# Patient Record
Sex: MALE | Race: WHITE | NOT HISPANIC OR LATINO | Employment: FULL TIME | URBAN - METROPOLITAN AREA
[De-identification: names, ages, dates, MRNs, and addresses within clinical notes are randomized per-mention and may not be internally consistent; named-entity substitution may affect disease eponyms.]

---

## 2018-02-23 PROBLEM — I10 ESSENTIAL HYPERTENSION: Status: ACTIVE | Noted: 2018-02-23

## 2018-02-23 PROBLEM — M79.7 FIBROMYALGIA: Status: ACTIVE | Noted: 2018-02-23

## 2018-02-23 PROBLEM — K51.90 ULCERATIVE COLITIS WITHOUT COMPLICATIONS: Status: ACTIVE | Noted: 2018-02-23

## 2018-05-09 PROBLEM — K51.90 ACUTE ULCERATIVE COLITIS: Status: ACTIVE | Noted: 2018-05-09

## 2018-10-24 PROBLEM — E78.5 HYPERLIPIDEMIA: Status: ACTIVE | Noted: 2018-10-24

## 2019-04-09 ENCOUNTER — PATIENT OUTREACH (OUTPATIENT)
Dept: ADMINISTRATIVE | Facility: HOSPITAL | Age: 64
End: 2019-04-09

## 2019-08-21 PROBLEM — K51.90 UC (ULCERATIVE COLITIS): Status: ACTIVE | Noted: 2019-08-21

## 2019-09-10 PROBLEM — D75.839 THROMBOCYTOSIS: Status: ACTIVE | Noted: 2019-09-10

## 2019-10-15 PROBLEM — J98.4 PNEUMATOCELE OF LUNG: Status: ACTIVE | Noted: 2019-10-15

## 2019-10-15 PROBLEM — R76.8 HEPATITIS C ANTIBODY TEST POSITIVE: Status: ACTIVE | Noted: 2019-10-15

## 2019-10-23 PROBLEM — K51.90 ACUTE ULCERATIVE COLITIS: Status: RESOLVED | Noted: 2018-05-09 | Resolved: 2019-10-23

## 2019-10-23 PROBLEM — K51.90 UC (ULCERATIVE COLITIS): Status: RESOLVED | Noted: 2019-08-21 | Resolved: 2019-10-23

## 2020-01-17 ENCOUNTER — TELEPHONE (OUTPATIENT)
Dept: PHARMACY | Facility: CLINIC | Age: 65
End: 2020-01-17

## 2020-01-17 NOTE — TELEPHONE ENCOUNTER
Informed Patient  that [facility] received prescription for Hydroxyurea and benefits investigation is required.  [facility] will be back in touch once insurance determination is received.

## 2020-01-17 NOTE — TELEPHONE ENCOUNTER
DOCUMENTATION ONLY:  Hydroxyurea 500 mg Capsule #60/30 does not require a prior authorization through the patient's insurance.     Co-pay: $1.00    Patient assistance IS NOT required. Sending to clinical pharmacist for  and shipment. Gladys SANCHEZ

## 2020-01-20 NOTE — TELEPHONE ENCOUNTER
Initial hydroxyurea consult completed on  . Hydroxyurea will be shipped on  to arrive at patient's home on  via [employer]. $ 1.00 copay. Patient plans to start hydroxyurea on . Address confirmed, CC on file. Confirmed 2 patient identifiers - name and . Therapy Appropriate.     Patient was counseled on the administration directions:  -Take 2 capsules (1000 mg) by mouth daily with or without food   -Do not chew, crush, or open capsules.   If possible, patient was instructed tip the tablets from the RX bottle to the cap, and take directly from the cap to the mouth.  Patient may handle the medication with their hands if they wear with a latex or nitrile glove and wash their hands before and after handling the tablets.  -Store medication at room temperature, away from direct sunlight, moisture and out of reach of children or pets    Patient states that he reviewed over the medication with the MD and read up on material. He declined going over the medication side effects at consultation today. Advised him to reach out to [facility] if he has any questions or concerns.     -Advise on appropriate sun protection including sunscreen, hats and long pants and sleeves when necessary to avoid increased risk of secondary skin cancers.  Long term use of hydroxyurea has been associated with myeloproliferative disorders and secondary leukemia as well.    DDIs:  Medication list reviewed.    Patient was given 2 patient education handouts on how to handle oral chemotherapy and specific recommendations- do's and don'ts. Instructed the patient that if they have any remaining oral chemotherapy, not to flush down the toilet or throw away in the trash; The patient or caregiver should return the unused oral chemotherapy to either the clinic or to myself in the Pharmacy where the oral chemotherapy can be disposed of properly.     Patient confirmed understanding. Patient did not have additional questions.   Consultation included:  indication; goals of treatment; administration; storage and handling; side effects; how to handle side effects; the importance of compliance; how to handle missed doses; the importance of laboratory monitoring; the importance of keeping all follow up appointments.  Patient understands to report any medication changes to [facility] and provider. All questions answered and addressed to patients satisfaction. I will f/u with patient in 1 week from start, [facility] to contact patient in 3 weeks for refills.

## 2020-01-28 NOTE — TELEPHONE ENCOUNTER
Contacted the patient in regards to Hydrea 7 day touchbase. He confirmed starting on 1/21 as planned and had lab work today. He has not had side effects to the medication but continues to have nausea, diarrhea/constipation and fatigue that was present at baseline. He confirmed proper administration: 2 capsules once every morning. He has been appropriately storing at room temp. No missed doses. Discussed we will continue to have an RPh f/u every three months but he can reach out with any questions/concerns.

## 2020-02-05 ENCOUNTER — TELEPHONE (OUTPATIENT)
Dept: PHARMACY | Facility: CLINIC | Age: 65
End: 2020-02-05

## 2020-02-05 NOTE — TELEPHONE ENCOUNTER
Contacted patient to let him know his provider has sent over a prescription to his local [facility] pharmacy for Zofran to help with his nausea. Patient verbalized understanding and has no further questions or concerns at this time. He is aware to contact [facility] if he needs anything else.

## 2020-02-05 NOTE — TELEPHONE ENCOUNTER
Contacted patient to discuss Hydrea dose increase and set up refill. Patient was increased to Hydrea 500 mg - 4 capsules QD. He has been taking this dose for a week now. He has about 3 days of therapy on hand. Denies missed doses. Will ship medication on 2/6 for the patient to receive on 2/7. $2 copay. CCOF No changes in medications allergies or health conditions.     Patient complains of foot and leg cramps/pain that was present prior to Hydrea therapy. He states he was told it was a circulation issue due to his blood being thick and that it should improve as his platelet counts go down. Patient reports previously taking opioid pain medication, but did not like to as he rather treat the underlying cause and manage the pain. Recommended to take OTC Advil or Aleve and/or Tylenol as needed to help ease pain in the meantime as they try to get his counts down.     Also, complains of fatigue, dizziness, and nausea since starting Hydrea. Monitor fatigue/dizziness. Asked whether he would like us to get his provider to send a prescription for an antiemetic to his local pharmacy. Patient would like that and asked if provider could send to the [facility] listed. Will message provider.     Patient has no questions or concerns at this time. He is aware to contact [facility] if he needs anything.

## 2020-02-21 ENCOUNTER — TELEPHONE (OUTPATIENT)
Dept: PHARMACY | Facility: CLINIC | Age: 65
End: 2020-02-21

## 2020-02-21 NOTE — TELEPHONE ENCOUNTER
Contacted patient to let him know provider would like him to reduced his Hydrea dose to 3 tablets daily based on Plt count of 288k on 2/19/2020. Patient verbalized understanding and states he is currently taking the medication this way. He is scheduled to get labs again on Wednesday, 2/26. Will follow up to see if he will continue on this dose, so we could reach out to the provider for a new prescription. The patient reports having plenty of medication on hand. He has no questions or concerns at this time. He is aware to contact [facility] if he needs anything.

## 2020-02-26 NOTE — TELEPHONE ENCOUNTER
Contacted patient to see if any further changes have been made to his dose following labs. Patient states the MD office has no contacted him yet to discuss. The patient currently takes Hydrea 500 mg - 3 capsules QD. Patient does not have any questions or concerns at this time. Will follow up to see if any further changes will be made to dose.

## 2020-03-05 NOTE — TELEPHONE ENCOUNTER
Contacted patient for Hydroxyurea refill and confirm he is still taking 3 capsules QD as instructed. Patient confirmed dose and directions. He is unsure of the quantity on hand, but estimates about 10 days remaining. Denies missed doses. Will ship medication on 3/9 for the patient to receive on 3/10. $2 copay. CCOF. Address confirmed. No changes in allergies or health conditions. Patient reports having issues with nausea and was recently prescribed ondansetron. He states he take the medication at bedtime with food to help as he worries of the side effects of ondansetron. Discussed his concerns and informed him if he would like to try ondansetron to take 30 minutes prior to hydroxyurea to give it a chance to work. He has no further questions or concerns at this time. He is aware to contact [facility] if he needs anything.

## 2020-04-03 ENCOUNTER — TELEPHONE (OUTPATIENT)
Dept: PHARMACY | Facility: CLINIC | Age: 65
End: 2020-04-03

## 2020-04-21 ENCOUNTER — TELEPHONE (OUTPATIENT)
Dept: PHARMACY | Facility: CLINIC | Age: 65
End: 2020-04-21

## 2020-04-21 NOTE — TELEPHONE ENCOUNTER
"Hydroxyurea follow-up:  Spoke with Mr. Stevenson, regarding ongoing hydroxyurea therapy.  Overall, he tolerates hydroxyurea well and "my counts are good", however, he continues to have severe pain that can debilitate him at times.     Dosing, how taking: Continues to alternate between 2 and 3 tablets on designated days of the week.  No missed doses.    Side effects: Patient denies any infections, rash/skin reactions, etc.  Reports occasional nausea, but uses ondansetron as needed.  He is mindful to protect himself from the sun and wears a hat, long sleeves and uses sunscreen when outdoors.  He works at a [employer] outside but is in the shade most of the time.    Recent infections: No recent infections/fevers.    Pain: 10/10 - he has severe pain that has been "having for years but its just getting worse".  He reports having "arthritis" that used to effect just his legs, but now seems to effect all areas of his body.  He describes the pain as "cramps" in his chest, arms, legs, etc.  He has never seen a rheumatologist to confirm diagnosis of arthritis and attempt to treat as an inflammatory condition.      He reports that he has tried OTC APAP and NSAIDs in the past and Lyrica, none of which helped.  The only thing that did help was Norco.  He took for about 3 years, but didn't want to be on opioids any longer and discontinued therapy.  He wishes to determine the source of his pain and treat this to eliminate the pain.  Will have appointment with PCP this week and will inquire about possibly seeing a rheumatologist to confirm/rule out an inflammatory condition.  The pain makes working difficult at times and can be debilitating causing his body to "lock up".    Appetite: Good - no changes.  Continues to eat well and drink plenty of water to stay well hydrated.    Energy, fatigue: Still working, but it is becoming increasingly difficult with severe pain.    Health, mood, QOL: Concerns about pain.  Will address with provider at " virtual visit scheduled later this week.    ED/UC visits: None.    Next clinical follow up: 3 months.  As patient wanted to discuss mostly pain, will try to complete more in-depth follow up in 3 months including storage, handling, and complete medication reconciliation.    Full medication reconciliation declined by patient, but reports being compliant with BP medications and reports no other changes.    Labs from 4/15/2020 reviewed - WBC, ANC, platelets all normal.  RBC stable and patient's MCV is elevated, but this is to be expected with hydroxyurea therapy.  Kidney and liver function normal.  Therapy remains appropriate.

## 2020-05-01 ENCOUNTER — TELEPHONE (OUTPATIENT)
Dept: PHARMACY | Facility: CLINIC | Age: 65
End: 2020-05-01

## 2020-05-18 ENCOUNTER — LAB VISIT (OUTPATIENT)
Dept: PRIMARY CARE CLINIC | Facility: CLINIC | Age: 65
End: 2020-05-18
Payer: MEDICAID

## 2020-05-18 DIAGNOSIS — R05.9 COUGH: Primary | ICD-10-CM

## 2020-05-18 PROCEDURE — U0003 INFECTIOUS AGENT DETECTION BY NUCLEIC ACID (DNA OR RNA); SEVERE ACUTE RESPIRATORY SYNDROME CORONAVIRUS 2 (SARS-COV-2) (CORONAVIRUS DISEASE [COVID-19]), AMPLIFIED PROBE TECHNIQUE, MAKING USE OF HIGH THROUGHPUT TECHNOLOGIES AS DESCRIBED BY CMS-2020-01-R: HCPCS

## 2020-05-19 LAB — SARS-COV-2 RNA RESP QL NAA+PROBE: NOT DETECTED

## 2020-05-27 ENCOUNTER — TELEPHONE (OUTPATIENT)
Dept: PHARMACY | Facility: CLINIC | Age: 65
End: 2020-05-27

## 2020-05-27 NOTE — TELEPHONE ENCOUNTER
"Patient called to refill Hydroxyurea 500 mg Capsule #90/30 the patient stated he "thinks" he has 8-9 days on hand. He is taking 3 Capsules daily, he took the medication today.  Ship 06/01 for 06/02 delivery.  Verified address.  Copay $0.00 in 004.  Patient has not started any new medications including OTC drugs. Patient has not had any medication/ dose or instruction changes. No new allergies or side effects reported with this shipment. Medication is being taken as prescribed by physician and properly stored.  Declined [facility] . -CAZ  "

## 2020-06-30 ENCOUNTER — TELEPHONE (OUTPATIENT)
Dept: PHARMACY | Facility: CLINIC | Age: 65
End: 2020-06-30

## 2020-06-30 NOTE — TELEPHONE ENCOUNTER
Refill call regarding Hydroxyurea at [facility].  Will prepare for shipment with patient consent on  to arrive .  Copay 0.00.  Patient states he has about 10 days on hand.  Patient has not started any new medications including OTC drugs. Patient has not had any medication/ dose or instruction changes. No new allergies or side effects reported with this shipment. Medication is being taken as prescribed by physician and properly stored. Two patient identifiers:  and Address verified. Patient has no questions or concerns for [facility].

## 2020-07-08 PROBLEM — D47.3 ESSENTIAL THROMBOCYTHEMIA: Status: ACTIVE | Noted: 2020-07-08

## 2020-07-13 ENCOUNTER — TELEPHONE (OUTPATIENT)
Dept: PHARMACY | Facility: CLINIC | Age: 65
End: 2020-07-13

## 2020-07-13 RX ORDER — ASPIRIN 81 MG/1
81 TABLET ORAL DAILY
COMMUNITY
End: 2022-10-05

## 2020-07-13 NOTE — TELEPHONE ENCOUNTER
Patient reached for followup assessment of hydroxyurea therapy. Patient reports he is currently taking 2 capsules (1000mg) qhs. Dose appropriate for dx of Essential thrombocythemia [D47.3], titrated based on response. inBasket sent to provider to request updated script with correct sig. (current sig is 3 caps (1500mg) daily.Patient reports no missed doses.   Labs: 7/8/2020: LFTs, CMP, and platelets WNL; CBC stable.    Dosing: Currently taking 2 capsules every day. Reduced dose following 7/8 MDO appt and plans to check back in with provider in one month to discuss respone. Has upcoming bloodwork (standing orders). Takes dose with evening meal before bed. Stores in weekly pill box. Never touches capsules (pours from vial into pill box and from pill box directly into mouth. Is aware of handling precautions.     Side effects: Patient listed symptoms of disease and medication side effects together:   Fatigue: Cut hours back at work from 70-90; back to 40-45 hours a week. Continues to work at the [employer] early to late. Still goes in early and leaves early.   sun sensitivity. Wearing sun hat with neck protector, and long sleeves. Drinks lots of fluids.  nausea - Gets nauseous in the morning - eating right away helps; only takes Zofran as a last resort.   Bad leg cramps at night. Feet feel like they're on fire.     Pain/QoL: Pain gets up to a 10 - wakes up multiple times a night to walk off the cramps. Was getting them in his hands but Voltaren gel once a day helps. Did try Voltaren gel more often but attributed GI bleed to its more frequent use (comorbities include Ulcerative colitis without complications [K51.90])  Health QoL (10-best) unanswered. Patient states he is pretty limited. Trouble with stairs. Can't run. Trouble walking. 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   ED/UC visits: none    Medication list reviewed. No new allergies or health conditions. Added Aspirin 81mg to drug list. No significant interactions encountered.  Patient states that provider does not believe atorvastatin 80mg is related to leg cramps. However, cholesterol is well controlled. inBasket sent to PCP to recommend that atorvastatin 40mg be trialed if she thinks it appropriate.     Next clinical follow up: Every month for MD; same for bloodwork. Next [facility] followup 90 days.

## 2020-08-08 ENCOUNTER — TELEPHONE (OUTPATIENT)
Dept: PHARMACY | Facility: CLINIC | Age: 65
End: 2020-08-08

## 2020-08-08 NOTE — TELEPHONE ENCOUNTER
Patient returned call regarding Hydroxyurea refill. Has about 3 weeks on hand. Patient is seeing MDO on 8/11 and said that medication might change. Patient does not have any refills left, so waiting until after appt to send refill request. Since patient has so many on hand, pending activity out to next week

## 2020-08-08 NOTE — TELEPHONE ENCOUNTER
RX call attempt 1 for Hydroxyurea refill at [facility]. $0 copay. No Answer, LVM. Mychart status declined.

## 2020-08-18 ENCOUNTER — TELEPHONE (OUTPATIENT)
Dept: PHARMACY | Facility: CLINIC | Age: 65
End: 2020-08-18

## 2020-08-18 PROBLEM — K51.90 UC (ULCERATIVE COLITIS): Status: ACTIVE | Noted: 2020-08-18

## 2020-08-18 NOTE — TELEPHONE ENCOUNTER
Refill call regarding Hydroxyurea at [facility].  Will prepare for shipment with patient consent on  to arrive .  Copay 0.00.  Patient states he has enough days on hand to last him through this week.  Patient has not started any new medications including OTC drugs. Patient has not had any medication/ dose or instruction changes. No new allergies or side effects reported with this shipment. Medication is being taken as prescribed by physician and properly stored. Two patient identifiers:  and Address verified. Patient has no questions or concerns for [facility].

## 2020-09-08 ENCOUNTER — TELEPHONE (OUTPATIENT)
Dept: PHARMACY | Facility: CLINIC | Age: 65
End: 2020-09-08

## 2020-10-02 ENCOUNTER — TELEPHONE (OUTPATIENT)
Dept: PHARMACY | Facility: CLINIC | Age: 65
End: 2020-10-02

## 2020-10-02 NOTE — TELEPHONE ENCOUNTER
Patient reached for hydroxyurea followup. Current dose: 2 capsules (1,000 mg total) by mouth once daily; dose remains appropriate for Essential thrombocythemia [D47.3]. Dose has been adjusted to maintain platelets <400,000/mm3. CBC on 9/8/2020 show platelets remaining in normal range. Reviewed warnings and potential side effects and their management with patient. Patient was counseled on the following possible side effects, which include, but are not limited to:  Myelosuppression, neutropenia, increased risk of infection and bleeding, pulmonary toxicity (ILD, pulmonary fibrosis, pneumonitis), headache. Patient was reminded to use sun protection including sunscreen, hats and long pants and sleeves when necessary to avoid increased risk of secondary skin cancers.  Long term use of hydroxyurea has been associated with myeloproliferative disorders and secondary leukemia as well.   Patient symptoms and side effects remain unchanged from last followup - though he states that adding gabapentin has had some beneficial effect on his leg cramps at night and stiffness in his hands. Trial of atorvasatin at lower dose did not help cramps, so atorvastatin 80mg daily was resumed. Patient reports he is still feeling fatigue and sun sensitivity. He is currently working lots of hours again due to being short-handed at the [employer]. No missed work due to feeling ill. He doesn't make plans outside of work. He continues to have bloodwork done about once monthly. Morning nausea is only associated with forgetting to eat breakfast. Patient continues to d rinks lots of fluids. Pain 8 - gets up to a 12 with cramps. Health QoL did not answer outright but stated he felt like he 'got another 5 years left'. Leg cramp pain continues to be his predominant concern. Reports his BP is good and his hands haven't locked up in awhile. Hasn't needed to use the Voltaren due to benefit from Gabapentin.   Patient did report that recently his chest was hurting  for 3 days. Didn't contact MD at all. Self-resolved. Didn't take any medications for the pain; 'Just drank water'. inBasket sent to PCP to inform office.   Allergies, Comorbidities, and Medication list reviewed. Alprazolam from past procedure removed from med list. No significant DDIs encountered.   Hydroxyurea therapy remains appropriate and effective at this time. No changes warranted.

## 2020-10-07 ENCOUNTER — TELEPHONE (OUTPATIENT)
Dept: PHARMACY | Facility: CLINIC | Age: 65
End: 2020-10-07

## 2020-10-20 ENCOUNTER — SPECIALTY PHARMACY (OUTPATIENT)
Dept: PHARMACY | Facility: CLINIC | Age: 65
End: 2020-10-20

## 2020-10-20 NOTE — TELEPHONE ENCOUNTER
Specialty Pharmacy - Refill Coordination    Specialty Medication Orders Linked to Encounter      Most Recent Value   Medication #1  hydroxyurea (HYDREA) 500 mg Cap (Order#[id number], Rx#[id number])          Refill Questions - Documented Responses      Most Recent Value   Relationship to patient of person spoken to?  Self   HIPAA/medical authority confirmed?  Yes   Any changes in contact preferences or allowed representatives?  No   Has the patient had any insurance changes?  No   Has the patient had any changes to specialty medication, dose, or instructions?  No   Has the patient started taking any new medications, herbals, or supplements?  No   Has the patient been diagnosed with any new medical conditions?  No   Does the patient have any new allergies to medications or foods?  No   Does the patient have any concerns about side effects?  No   Can the patient store medication/sharps container properly (at the correct temperature, away from children/pets, etc.)?  Yes   Can the patient call emergency services (911) in the event of an emergency?  Yes   Does the patient have any concerns or questions about taking or administering this medication as prescribed?  No   How many doses did the patient miss in the past 4 weeks or since the last fill?  0   How many doses does the patient have on hand?  8   How many days does the patient report on hand quantity will last?  4   Does the number of doses/days supply remaining match pharmacy expected amounts?  Yes   How will the patient receive the medication?  Mail   When does the patient need to receive the medication?  10/24/20   Shipping Address  Home   Address in [facility] confirmed and updated if neccessary?  Yes   Expected Copay ($)  0   Is the patient able to afford the medication copay?  Yes   Payment Method  zero copay   Days supply of Refill  30   Would patient like to speak to a pharmacist?  No   Do you want to trigger an intervention?  No   Do you want to  trigger an additional referral task?  No   Refill activity completed?  Yes   Refill activity plan  Refill scheduled   Shipment/Pickup Date:  10/21/20          Current Outpatient Medications   Medication Sig    albuterol sulfate (PROAIR RESPICLICK) 90 mcg/actuation AePB Inhale 180 mcg into the lungs every 4 (four) hours. Rescue    ALPRAZolam (XANAX) 0.5 MG tablet Take 1 tablet (0.5 mg total) by mouth On call Procedure. Take one tablet the night before the bone marrow biopsy and take 1 tablet the morning of bone marrow biopsy. Must have . (Patient not taking: Reported on 8/11/2020)    amLODIPine (NORVASC) 10 MG tablet Take 1 tablet (10 mg total) by mouth once daily.    aspirin (ECOTRIN) 81 MG EC tablet Take 81 mg by mouth once daily.    atorvastatin (LIPITOR) 80 MG tablet Take 1 tablet (80 mg total) by mouth every evening.    CALCIUM CARBONATE/VITAMIN D3 (VITAMIN D-3 ORAL) Take by mouth.    diclofenac sodium (VOLTAREN) 1 % Gel Apply 2 g topically 4 (four) times daily. (Patient not taking: Reported on 10/2/2020)    fluticasone propionate (FLONASE) 50 mcg/actuation nasal spray 2 sprays (100 mcg total) by Each Nostril route once daily. Rinse mouth with water after use    gabapentin (NEURONTIN) 300 MG capsule Take 1 capsule (300 mg total) by mouth every evening.    hydroxyurea (HYDREA) 500 mg Cap Take 2 capsules (1,000 mg total) by mouth once daily.    lisinopril-hydrochlorothiazide (PRINZIDE,ZESTORETIC) 20-25 mg Tab Take 1 tablet by mouth once daily.    ondansetron HCl (ZOFRAN ORAL) Take by mouth daily as needed.   Last reviewed on 10/7/2020  1:06 PM by Lucas Antonio MD    Review of patient's allergies indicates:   Allergen Reactions    Bee sting [allergen ext-venom-honey bee]     Last reviewed on  10/7/2020 1:06 PM by Lucas Antonio      Tasks added this encounter   No tasks added.   Tasks due within next 3 months   10/19/2020 - Refill Call     [facility] - Specialty  Pharmacy  [address] [address]  Phone: [phone]  Fax: [phone]

## 2020-11-20 ENCOUNTER — SPECIALTY PHARMACY (OUTPATIENT)
Dept: PHARMACY | Facility: CLINIC | Age: 65
End: 2020-11-20

## 2020-11-20 NOTE — TELEPHONE ENCOUNTER
Specialty Pharmacy - Refill Coordination    Specialty Medication Orders Linked to Encounter      Most Recent Value   Medication #1  hydroxyurea (HYDREA) 500 mg Cap (Order#[id number], Rx#[id number])          Refill Questions - Documented Responses      Most Recent Value   Relationship to patient of person spoken to?  Self   HIPAA/medical authority confirmed?  Yes   Any changes in contact preferences or allowed representatives?  No   Has the patient had any insurance changes?  No   Has the patient had any changes to specialty medication, dose, or instructions?  No   Has the patient started taking any new medications, herbals, or supplements?  No   Has the patient been diagnosed with any new medical conditions?  No   Does the patient have any new allergies to medications or foods?  No   Does the patient have any concerns about side effects?  No   Can the patient store medication/sharps container properly (at the correct temperature, away from children/pets, etc.)?  Yes   Can the patient call emergency services (911) in the event of an emergency?  Yes   Does the patient have any concerns or questions about taking or administering this medication as prescribed?  No   How many doses did the patient miss in the past 4 weeks or since the last fill?  0   How many doses does the patient have on hand?  5   How many days does the patient report on hand quantity will last?  5   Does the number of doses/days supply remaining match pharmacy expected amounts?  Yes   Does the patient feel that this medication is effective?  Yes   During the past 4 weeks, has patient missed any activities due to condition or medication?  No   During the past 4 weeks, did patient have any of the following urgent care visits?  None   How will the patient receive the medication?  Mail   When does the patient need to receive the medication?  11/25/20   Shipping Address  Home   Address in [facility] confirmed and updated if neccessary?  Yes    Expected Copay ($)  0   Is the patient able to afford the medication copay?  Yes   Payment Method  zero copay   Days supply of Refill  30   Would patient like to speak to a pharmacist?  No   Do you want to trigger an intervention?  No   Do you want to trigger an additional referral task?  No   Refill activity completed?  Yes   Refill activity plan  Refill scheduled   Shipment/Pickup Date:  11/23/20          Current Outpatient Medications   Medication Sig    amLODIPine (NORVASC) 10 MG tablet Take 1 tablet (10 mg total) by mouth once daily.    aspirin (ECOTRIN) 81 MG EC tablet Take 81 mg by mouth once daily.    atorvastatin (LIPITOR) 80 MG tablet Take 1 tablet (80 mg total) by mouth every evening.    CALCIUM CARBONATE/VITAMIN D3 (VITAMIN D-3 ORAL) Take by mouth.    fluticasone propionate (FLONASE) 50 mcg/actuation nasal spray 2 sprays (100 mcg total) by Each Nostril route once daily. Rinse mouth with water after use    gabapentin (NEURONTIN) 300 MG capsule Take 1 capsule (300 mg total) by mouth every evening.    hydroCHLOROthiazide (HYDRODIURIL) 25 MG tablet Take 1 tablet (25 mg total) by mouth once daily.    hydroxyurea (HYDREA) 500 mg Cap Take 2 capsules (1,000 mg total) by mouth once daily.    lisinopriL (PRINIVIL,ZESTRIL) 30 MG tablet Take 1 tablet (30 mg total) by mouth once daily.    ondansetron HCl (ZOFRAN ORAL) Take by mouth daily as needed.    PROAIR RESPICLICK 90 mcg/actuation inhaler Inhale 180 mcg(2 PUFFS) into the lungs every 4 (four) hours. Rescue   Last reviewed on 10/21/2020  2:18 PM by Maria C Galeas MD    Review of patient's allergies indicates:   Allergen Reactions    Bee sting [allergen ext-venom-honey bee]     Last reviewed on  10/21/2020 2:18 PM by Maria C Galeas      Tasks added this encounter   No tasks added.   Tasks due within next 3 months   11/12/2020 - Refill Call (Auto Added)     Mariah Lynn  [facility]  [address]  [place] [address]  Phone: [phone]  Fax: [phone]

## 2020-12-14 ENCOUNTER — TELEPHONE (OUTPATIENT)
Dept: SMOKING CESSATION | Facility: CLINIC | Age: 65
End: 2020-12-14

## 2020-12-18 ENCOUNTER — SPECIALTY PHARMACY (OUTPATIENT)
Dept: PHARMACY | Facility: CLINIC | Age: 65
End: 2020-12-18

## 2020-12-18 NOTE — TELEPHONE ENCOUNTER
Specialty Pharmacy - Refill Coordination    Specialty Medication Orders Linked to Encounter      Most Recent Value   Medication #1  hydroxyurea (HYDREA) 500 mg Cap (Order#[id number], Rx#[id number])          Refill Questions - Documented Responses      Most Recent Value   Relationship to patient of person spoken to?  Self   HIPAA/medical authority confirmed?  Yes   Any changes in contact preferences or allowed representatives?  No   Has the patient had any insurance changes?  No   Has the patient had any changes to specialty medication, dose, or instructions?  No   Has the patient started taking any new medications, herbals, or supplements?  No   Has the patient been diagnosed with any new medical conditions?  No   Does the patient have any new allergies to medications or foods?  No   Does the patient have any concerns about side effects?  No   Can the patient store medication/sharps container properly (at the correct temperature, away from children/pets, etc.)?  Yes   Can the patient call emergency services (911) in the event of an emergency?  Yes   Does the patient have any concerns or questions about taking or administering this medication as prescribed?  No   How many doses did the patient miss in the past 4 weeks or since the last fill?  0   How many doses does the patient have on hand?  7   How many days does the patient report on hand quantity will last?  7   Does the number of doses/days supply remaining match pharmacy expected amounts?  Yes   Does the patient feel that this medication is effective?  Yes   During the past 4 weeks, has patient missed any activities due to condition or medication?  No   During the past 4 weeks, did patient have any of the following urgent care visits?  None   How will the patient receive the medication?  Mail   When does the patient need to receive the medication?  12/25/20   Shipping Address  Home   Address in [facility] confirmed and updated if neccessary?  Yes    Expected Copay ($)  0   Is the patient able to afford the medication copay?  Yes   Payment Method  zero copay   Days supply of Refill  28   Would patient like to speak to a pharmacist?  No   Do you want to trigger an intervention?  No   Do you want to trigger an additional referral task?  No   Refill activity completed?  Yes   Refill activity plan  Refill scheduled   Shipment/Pickup Date:  12/22/20          Current Outpatient Medications   Medication Sig    amLODIPine (NORVASC) 10 MG tablet Take 1 tablet (10 mg total) by mouth once daily.    aspirin (ECOTRIN) 81 MG EC tablet Take 81 mg by mouth once daily.    atorvastatin (LIPITOR) 80 MG tablet Take 1 tablet (80 mg total) by mouth every evening.    CALCIUM CARBONATE/VITAMIN D3 (VITAMIN D-3 ORAL) Take by mouth.    fluticasone propionate (FLONASE) 50 mcg/actuation nasal spray 2 sprays (100 mcg total) by Each Nostril route once daily. Rinse mouth with water after use    gabapentin (NEURONTIN) 300 MG capsule Take 1 capsule (300 mg total) by mouth every evening.    hydroCHLOROthiazide (HYDRODIURIL) 25 MG tablet Take 1 tablet (25 mg total) by mouth once daily.    hydroxyurea (HYDREA) 500 mg Cap Take 2 capsules (1,000 mg total) by mouth once daily.    lisinopriL (PRINIVIL,ZESTRIL) 30 MG tablet Take 1 tablet (30 mg total) by mouth once daily.    ondansetron HCl (ZOFRAN ORAL) Take by mouth daily as needed.    PROAIR RESPICLICK 90 mcg/actuation inhaler Inhale 180 mcg(2 PUFFS) into the lungs every 4 (four) hours. Rescue   Last reviewed on 11/24/2020 10:52 AM by Maria C Galeas MD    Review of patient's allergies indicates:   Allergen Reactions    Bee sting [allergen ext-venom-honey bee]     Last reviewed on  11/24/2020 10:52 AM by Maria C Galeas      Tasks added this encounter   No tasks added.   Tasks due within next 3 months   12/18/2020 - Refill Call (Auto Added)     Mariah Lynn  [facility]  [address]  [place] [address]  Phone: [phone]  Fax: [phone]

## 2021-01-06 ENCOUNTER — CLINICAL SUPPORT (OUTPATIENT)
Dept: SMOKING CESSATION | Facility: CLINIC | Age: 66
End: 2021-01-06
Payer: COMMERCIAL

## 2021-01-06 DIAGNOSIS — F17.210 HEAVY CIGARETTE SMOKER (20-39 PER DAY): Primary | ICD-10-CM

## 2021-01-06 PROBLEM — Z51.11 ENCOUNTER FOR CHEMOTHERAPY MANAGEMENT: Status: ACTIVE | Noted: 2021-01-06

## 2021-01-06 PROCEDURE — 99404 PR PREVENT COUNSEL,INDIV,60 MIN: ICD-10-PCS | Mod: S$GLB,,,

## 2021-01-06 PROCEDURE — 99404 PREV MED CNSL INDIV APPRX 60: CPT | Mod: S$GLB,,,

## 2021-01-06 RX ORDER — BUPROPION HYDROCHLORIDE 150 MG/1
TABLET, EXTENDED RELEASE ORAL
Qty: 60 TABLET | Refills: 0 | Status: SHIPPED | OUTPATIENT
Start: 2021-01-06 | End: 2021-02-03 | Stop reason: SDUPTHER

## 2021-01-06 RX ORDER — MICONAZOLE NITRATE 2 %
CREAM (GRAM) TOPICAL
Qty: 380 EACH | Refills: 0 | Status: SHIPPED | OUTPATIENT
Start: 2021-01-06 | End: 2021-02-23 | Stop reason: SDUPTHER

## 2021-01-25 ENCOUNTER — SPECIALTY PHARMACY (OUTPATIENT)
Dept: PHARMACY | Facility: CLINIC | Age: 66
End: 2021-01-25

## 2021-02-03 ENCOUNTER — CLINICAL SUPPORT (OUTPATIENT)
Dept: SMOKING CESSATION | Facility: CLINIC | Age: 66
End: 2021-02-03
Payer: COMMERCIAL

## 2021-02-03 DIAGNOSIS — F17.200 NICOTINE DEPENDENCE: Primary | ICD-10-CM

## 2021-02-03 DIAGNOSIS — F17.210 HEAVY CIGARETTE SMOKER (20-39 PER DAY): ICD-10-CM

## 2021-02-03 PROCEDURE — 99402 PR PREVENT COUNSEL,INDIV,30 MIN: ICD-10-PCS | Mod: S$GLB,,,

## 2021-02-03 PROCEDURE — 99402 PREV MED CNSL INDIV APPRX 30: CPT | Mod: S$GLB,,,

## 2021-02-03 RX ORDER — BUPROPION HYDROCHLORIDE 150 MG/1
150 TABLET, EXTENDED RELEASE ORAL 2 TIMES DAILY
Qty: 60 TABLET | Refills: 0 | Status: SHIPPED | OUTPATIENT
Start: 2021-02-03 | End: 2021-02-23 | Stop reason: SDUPTHER

## 2021-02-23 ENCOUNTER — CLINICAL SUPPORT (OUTPATIENT)
Dept: SMOKING CESSATION | Facility: CLINIC | Age: 66
End: 2021-02-23
Payer: COMMERCIAL

## 2021-02-23 DIAGNOSIS — F17.200 NICOTINE DEPENDENCE: Primary | ICD-10-CM

## 2021-02-23 DIAGNOSIS — F17.210 HEAVY CIGARETTE SMOKER (20-39 PER DAY): ICD-10-CM

## 2021-02-23 PROCEDURE — 99402 PREV MED CNSL INDIV APPRX 30: CPT | Mod: S$GLB,,,

## 2021-02-23 PROCEDURE — 99402 PR PREVENT COUNSEL,INDIV,30 MIN: ICD-10-PCS | Mod: S$GLB,,,

## 2021-02-23 RX ORDER — BUPROPION HYDROCHLORIDE 150 MG/1
150 TABLET, EXTENDED RELEASE ORAL 2 TIMES DAILY
Qty: 60 TABLET | Refills: 0 | Status: SHIPPED | OUTPATIENT
Start: 2021-02-23 | End: 2021-03-31 | Stop reason: SDUPTHER

## 2021-02-23 RX ORDER — MICONAZOLE NITRATE 2 %
CREAM (GRAM) TOPICAL
Qty: 380 EACH | Refills: 0 | Status: SHIPPED | OUTPATIENT
Start: 2021-02-23 | End: 2021-03-31 | Stop reason: SDUPTHER

## 2021-02-25 ENCOUNTER — SPECIALTY PHARMACY (OUTPATIENT)
Dept: PHARMACY | Facility: CLINIC | Age: 66
End: 2021-02-25

## 2021-03-23 ENCOUNTER — PATIENT MESSAGE (OUTPATIENT)
Dept: PHARMACY | Facility: CLINIC | Age: 66
End: 2021-03-23

## 2021-03-24 ENCOUNTER — SPECIALTY PHARMACY (OUTPATIENT)
Dept: PHARMACY | Facility: CLINIC | Age: 66
End: 2021-03-24

## 2021-03-31 ENCOUNTER — CLINICAL SUPPORT (OUTPATIENT)
Dept: SMOKING CESSATION | Facility: CLINIC | Age: 66
End: 2021-03-31
Payer: COMMERCIAL

## 2021-03-31 DIAGNOSIS — F17.200 NICOTINE DEPENDENCE: Primary | ICD-10-CM

## 2021-03-31 DIAGNOSIS — F17.210 HEAVY CIGARETTE SMOKER (20-39 PER DAY): ICD-10-CM

## 2021-03-31 PROCEDURE — 99402 PR PREVENT COUNSEL,INDIV,30 MIN: ICD-10-PCS | Mod: S$GLB,,,

## 2021-03-31 PROCEDURE — 99402 PREV MED CNSL INDIV APPRX 30: CPT | Mod: S$GLB,,,

## 2021-03-31 RX ORDER — BUPROPION HYDROCHLORIDE 150 MG/1
150 TABLET, EXTENDED RELEASE ORAL 2 TIMES DAILY
Qty: 60 TABLET | Refills: 0 | Status: SHIPPED | OUTPATIENT
Start: 2021-03-31 | End: 2021-04-21

## 2021-03-31 RX ORDER — MICONAZOLE NITRATE 2 %
CREAM (GRAM) TOPICAL
Qty: 380 EACH | Refills: 0 | Status: SHIPPED | OUTPATIENT
Start: 2021-03-31 | End: 2022-03-02

## 2021-04-16 ENCOUNTER — PATIENT MESSAGE (OUTPATIENT)
Dept: RESEARCH | Facility: HOSPITAL | Age: 66
End: 2021-04-16

## 2021-04-21 ENCOUNTER — CLINICAL SUPPORT (OUTPATIENT)
Dept: SMOKING CESSATION | Facility: CLINIC | Age: 66
End: 2021-04-21
Payer: COMMERCIAL

## 2021-04-21 DIAGNOSIS — F17.200 NICOTINE DEPENDENCE: Primary | ICD-10-CM

## 2021-04-21 PROCEDURE — 99402 PREV MED CNSL INDIV APPRX 30: CPT | Mod: S$GLB,,,

## 2021-04-21 PROCEDURE — 99402 PR PREVENT COUNSEL,INDIV,30 MIN: ICD-10-PCS | Mod: S$GLB,,,

## 2021-04-22 ENCOUNTER — CLINICAL SUPPORT (OUTPATIENT)
Dept: SMOKING CESSATION | Facility: CLINIC | Age: 66
End: 2021-04-22
Payer: COMMERCIAL

## 2021-04-22 DIAGNOSIS — F17.200 NICOTINE DEPENDENCE: Primary | ICD-10-CM

## 2021-04-22 PROCEDURE — 99407 PR TOBACCO USE CESSATION INTENSIVE >10 MINUTES: ICD-10-PCS | Mod: S$GLB,,,

## 2021-04-22 PROCEDURE — 99407 BEHAV CHNG SMOKING > 10 MIN: CPT | Mod: S$GLB,,,

## 2021-04-27 ENCOUNTER — PATIENT MESSAGE (OUTPATIENT)
Dept: PHARMACY | Facility: CLINIC | Age: 66
End: 2021-04-27

## 2021-04-27 ENCOUNTER — SPECIALTY PHARMACY (OUTPATIENT)
Dept: PHARMACY | Facility: CLINIC | Age: 66
End: 2021-04-27

## 2021-05-16 DIAGNOSIS — U07.1 COVID-19 VIRUS DETECTED: ICD-10-CM

## 2021-05-17 ENCOUNTER — PATIENT OUTREACH (OUTPATIENT)
Dept: INFECTIOUS DISEASES | Facility: HOSPITAL | Age: 66
End: 2021-05-17

## 2021-05-18 ENCOUNTER — PATIENT OUTREACH (OUTPATIENT)
Dept: INFECTIOUS DISEASES | Facility: HOSPITAL | Age: 66
End: 2021-05-18

## 2021-05-18 ENCOUNTER — PATIENT MESSAGE (OUTPATIENT)
Dept: INFECTIOUS DISEASES | Facility: HOSPITAL | Age: 66
End: 2021-05-18

## 2021-05-18 DIAGNOSIS — U07.1 COVID-19: Primary | ICD-10-CM

## 2021-05-19 ENCOUNTER — INFUSION (OUTPATIENT)
Dept: INFECTIOUS DISEASES | Facility: HOSPITAL | Age: 66
End: 2021-05-19
Attending: FAMILY MEDICINE
Payer: MEDICAID

## 2021-05-19 ENCOUNTER — NURSE TRIAGE (OUTPATIENT)
Dept: ADMINISTRATIVE | Facility: CLINIC | Age: 66
End: 2021-05-19

## 2021-05-19 VITALS
TEMPERATURE: 98 F | RESPIRATION RATE: 18 BRPM | OXYGEN SATURATION: 99 % | SYSTOLIC BLOOD PRESSURE: 135 MMHG | HEART RATE: 55 BPM | DIASTOLIC BLOOD PRESSURE: 67 MMHG

## 2021-05-19 DIAGNOSIS — U07.1 COVID-19: ICD-10-CM

## 2021-05-19 PROCEDURE — 25000003 PHARM REV CODE 250: Performed by: FAMILY MEDICINE

## 2021-05-19 PROCEDURE — M0243 CASIRIVI AND IMDEVI INFUSION: HCPCS | Performed by: FAMILY MEDICINE

## 2021-05-19 PROCEDURE — 63600175 PHARM REV CODE 636 W HCPCS: Performed by: FAMILY MEDICINE

## 2021-05-19 RX ORDER — SODIUM CHLORIDE 0.9 % (FLUSH) 0.9 %
10 SYRINGE (ML) INJECTION
Status: DISCONTINUED | OUTPATIENT
Start: 2021-05-19 | End: 2022-03-02

## 2021-05-19 RX ORDER — EPINEPHRINE 0.3 MG/.3ML
0.3 INJECTION SUBCUTANEOUS
Status: DISCONTINUED | OUTPATIENT
Start: 2021-05-19 | End: 2022-03-02

## 2021-05-19 RX ORDER — ONDANSETRON 4 MG/1
4 TABLET, ORALLY DISINTEGRATING ORAL ONCE AS NEEDED
Status: DISCONTINUED | OUTPATIENT
Start: 2021-05-19 | End: 2022-03-02

## 2021-05-19 RX ORDER — DIPHENHYDRAMINE HYDROCHLORIDE 50 MG/ML
25 INJECTION INTRAMUSCULAR; INTRAVENOUS ONCE AS NEEDED
Status: DISCONTINUED | OUTPATIENT
Start: 2021-05-19 | End: 2022-03-02

## 2021-05-19 RX ORDER — ACETAMINOPHEN 325 MG/1
650 TABLET ORAL ONCE AS NEEDED
Status: DISCONTINUED | OUTPATIENT
Start: 2021-05-19 | End: 2022-03-02

## 2021-05-19 RX ADMIN — CASIRIVIMAB 1200 MG: 300 INJECTION, SOLUTION, CONCENTRATE INTRAVENOUS at 10:05

## 2021-05-20 ENCOUNTER — NURSE TRIAGE (OUTPATIENT)
Dept: ADMINISTRATIVE | Facility: CLINIC | Age: 66
End: 2021-05-20

## 2021-05-31 ENCOUNTER — SPECIALTY PHARMACY (OUTPATIENT)
Dept: PHARMACY | Facility: CLINIC | Age: 66
End: 2021-05-31

## 2021-07-03 ENCOUNTER — SPECIALTY PHARMACY (OUTPATIENT)
Dept: PHARMACY | Facility: CLINIC | Age: 66
End: 2021-07-03

## 2021-07-30 ENCOUNTER — CLINICAL SUPPORT (OUTPATIENT)
Dept: SMOKING CESSATION | Facility: CLINIC | Age: 66
End: 2021-07-30
Payer: COMMERCIAL

## 2021-07-30 DIAGNOSIS — F17.200 NICOTINE DEPENDENCE: Primary | ICD-10-CM

## 2021-07-30 PROCEDURE — 99407 BEHAV CHNG SMOKING > 10 MIN: CPT | Mod: S$GLB,,,

## 2021-07-30 PROCEDURE — 99407 PR TOBACCO USE CESSATION INTENSIVE >10 MINUTES: ICD-10-PCS | Mod: S$GLB,,,

## 2021-08-02 ENCOUNTER — SPECIALTY PHARMACY (OUTPATIENT)
Dept: PHARMACY | Facility: CLINIC | Age: 66
End: 2021-08-02

## 2021-09-06 ENCOUNTER — SPECIALTY PHARMACY (OUTPATIENT)
Dept: PHARMACY | Facility: CLINIC | Age: 66
End: 2021-09-06
Payer: MEDICAID

## 2021-09-06 DIAGNOSIS — D75.839 THROMBOCYTOSIS: Primary | ICD-10-CM

## 2021-10-12 ENCOUNTER — SPECIALTY PHARMACY (OUTPATIENT)
Dept: PHARMACY | Facility: CLINIC | Age: 66
End: 2021-10-12

## 2021-10-21 ENCOUNTER — TELEPHONE (OUTPATIENT)
Dept: SMOKING CESSATION | Facility: CLINIC | Age: 66
End: 2021-10-21

## 2021-11-08 ENCOUNTER — SPECIALTY PHARMACY (OUTPATIENT)
Dept: PHARMACY | Facility: CLINIC | Age: 66
End: 2021-11-08
Payer: MEDICAID

## 2021-12-28 ENCOUNTER — HOSPITAL ENCOUNTER (EMERGENCY)
Facility: HOSPITAL | Age: 66
Discharge: HOME | End: 2021-12-28
Attending: STUDENT IN AN ORGANIZED HEALTH CARE EDUCATION/TRAINING PROGRAM
Payer: MEDICAID

## 2021-12-28 VITALS
HEART RATE: 72 BPM | TEMPERATURE: 98 F | RESPIRATION RATE: 20 BRPM | DIASTOLIC BLOOD PRESSURE: 62 MMHG | SYSTOLIC BLOOD PRESSURE: 129 MMHG | BODY MASS INDEX: 30.07 KG/M2 | OXYGEN SATURATION: 97 % | WEIGHT: 215.63 LBS

## 2021-12-28 DIAGNOSIS — Z20.822 CLOSE EXPOSURE TO COVID-19 VIRUS: Primary | ICD-10-CM

## 2021-12-28 LAB — SARS-COV-2 RDRP RESP QL NAA+PROBE: NEGATIVE

## 2021-12-28 PROCEDURE — 99282 EMERGENCY DEPT VISIT SF MDM: CPT

## 2021-12-28 PROCEDURE — U0002 COVID-19 LAB TEST NON-CDC: HCPCS | Performed by: STUDENT IN AN ORGANIZED HEALTH CARE EDUCATION/TRAINING PROGRAM

## 2021-12-28 NOTE — ED TRIAGE NOTES
66 y.o. male presents to ER   Chief Complaint   Patient presents with    COVID-19 Concerns     + exposure to COVID   Asymptomatic    . No acute distress noted.

## 2021-12-28 NOTE — DISCHARGE INSTRUCTIONS
If you are positive please quarantine for 7 days  May take tylenol motrin  If you are positive you may call the infusion center at [phone]

## 2021-12-28 NOTE — ED PROVIDER NOTES
Encounter Date: 12/28/2021       History     Chief Complaint   Patient presents with    COVID-19 Concerns     + exposure to COVID   Asymptomatic      covid exposure  66 year old male who presents with covid exposure over the last 2 days.. Denies any current symptoms.  Denies any cough congestion shortness of breath fever body aches or chills denies any medications        Review of patient's allergies indicates:   Allergen Reactions    Bee sting [allergen ext-venom-honey bee]      Past Medical History:   Diagnosis Date    Deep vein thrombosis     Fever blister     Hypertension     Ulcerative colitis      Past Surgical History:   Procedure Laterality Date    COLONOSCOPY      COLONOSCOPY N/A 5/9/2018    Procedure: COLONOSCOPY;  Surgeon: Elijah Horvath MD;  Location: [facility];  Service: Endoscopy;  Laterality: N/A;    COLONOSCOPY N/A 8/21/2019    Procedure: COLONOSCOPY;  Surgeon: Elijah Horvath MD;  Location: [facility];  Service: Endoscopy;  Laterality: N/A;    COLONOSCOPY N/A 8/18/2020    Procedure: COLONOSCOPY;  Surgeon: Elijah Horvath MD;  Location: [facility];  Service: Endoscopy;  Laterality: N/A;    COLONOSCOPY N/A 11/18/2021    Procedure: COLONOSCOPY;  Surgeon: Reynaldo Brown MD;  Location: [facility];  Service: Endoscopy;  Laterality: N/A;  Rapid Covid     Family History   Problem Relation Age of Onset    Heart disease Mother     Cancer Father         colon    Colon cancer Father     Crohn's disease Daughter      Social History     Tobacco Use    Smoking status: Current Every Day Smoker     Types: Cigarettes     Last attempt to quit: 2/23/2018     Years since quitting: 3.8    Smokeless tobacco: Current User   Substance Use Topics    Alcohol use: Yes     Alcohol/week: 6.0 standard drinks     Types: 6 Cans of beer per week     Comment: beer nightly     Drug use: Yes     Types: Marijuana     Comment: Hx ivda-quit 80's     Review of Systems   Constitutional:  Negative.    HENT: Negative.    Respiratory: Negative.    Cardiovascular: Negative.        Physical Exam     Initial Vitals [12/28/21 1315]   BP Pulse Resp Temp SpO2   129/62 72 20 97.6 °F (36.4 °C) 97 %      MAP       --         Physical Exam    Nursing note and vitals reviewed.  Constitutional: He appears well-developed and well-nourished.   HENT:   Head: Normocephalic and atraumatic.   Eyes: EOM are normal. Pupils are equal, round, and reactive to light.   Neck: Neck supple.   Normal range of motion.  Cardiovascular: Normal rate and regular rhythm.   Pulmonary/Chest: Breath sounds normal.   Abdominal: Abdomen is soft.   Musculoskeletal:      Cervical back: Normal range of motion and neck supple.     Skin: Skin is warm and dry.         ED Course   Procedures  Labs Reviewed   SARS-COV-2 RNA AMPLIFICATION, QUAL          Imaging Results    None          Medications - No data to display  Medical Decision Making:   Differential Diagnosis:   Covid, covid exposure, URI  ED Management:  [age] male presents to be evaluated for COVID after being exposed at work yesterday day before.  He has no symptoms at present cough congestion of signs of hypoxia no fever lungs clear auscultation swabbed for COVID was negative instructed to follow up PCP as needed given return precautions including but not limited to new worsening symptoms                      Clinical Impression:   Final diagnoses:  [Z20.822] Close exposure to COVID-19 virus (Primary)          ED Disposition Condition    Discharge Stable        ED Prescriptions     None        Follow-up Information    None          Latha Barker NP  12/28/21 7963

## 2022-03-01 ENCOUNTER — CLINICAL SUPPORT (OUTPATIENT)
Dept: SMOKING CESSATION | Facility: CLINIC | Age: 67
End: 2022-03-01
Payer: COMMERCIAL

## 2022-03-01 DIAGNOSIS — F17.200 NICOTINE DEPENDENCE: Primary | ICD-10-CM

## 2022-03-01 PROCEDURE — 99407 BEHAV CHNG SMOKING > 10 MIN: CPT | Mod: S$GLB,,,

## 2022-03-01 PROCEDURE — 99407 PR TOBACCO USE CESSATION INTENSIVE >10 MINUTES: ICD-10-PCS | Mod: S$GLB,,,

## 2022-03-01 NOTE — PROGRESS NOTES
Spoke with patient today in regard to smoking cessation progress for 6 month phone follow up on quit 1. Patient not tobacco free at this time. Patient has scheduled an appointment to return to the program for Quit attempt #2. Informed patient of benefit period, future follow ups, and contact information if any further help or support is needed. Will complete smart form and resolve Quit attempt #1.

## 2022-03-09 ENCOUNTER — CLINICAL SUPPORT (OUTPATIENT)
Dept: SMOKING CESSATION | Facility: CLINIC | Age: 67
End: 2022-03-09
Payer: COMMERCIAL

## 2022-03-09 DIAGNOSIS — F17.210 HEAVY CIGARETTE SMOKER (20-39 PER DAY): Primary | ICD-10-CM

## 2022-03-09 PROCEDURE — 99404 PR PREVENT COUNSEL,INDIV,60 MIN: ICD-10-PCS | Mod: S$GLB,,,

## 2022-03-09 PROCEDURE — 99404 PREV MED CNSL INDIV APPRX 60: CPT | Mod: S$GLB,,,

## 2022-03-09 RX ORDER — MICONAZOLE NITRATE 2 %
CREAM (GRAM) TOPICAL
Qty: 380 EACH | Refills: 0 | Status: SHIPPED | OUTPATIENT
Start: 2022-03-09 | End: 2022-04-20

## 2022-03-09 NOTE — PROGRESS NOTES
Patient currently smoking 2 packs per day and will begin 1:1 cessation therapy with CTTS. Patient will begin prescribed tobacco cessation medication regimen of 2mg nicotine gum, along with remaining on 150mg bupropion BID recently prescribed by his PCP.

## 2022-03-23 ENCOUNTER — CLINICAL SUPPORT (OUTPATIENT)
Dept: SMOKING CESSATION | Facility: CLINIC | Age: 67
End: 2022-03-23
Payer: COMMERCIAL

## 2022-03-23 DIAGNOSIS — F17.200 NICOTINE DEPENDENCE: Primary | ICD-10-CM

## 2022-03-23 PROCEDURE — 99402 PREV MED CNSL INDIV APPRX 30: CPT | Mod: S$GLB,,,

## 2022-03-23 PROCEDURE — 99402 PR PREVENT COUNSEL,INDIV,30 MIN: ICD-10-PCS | Mod: S$GLB,,,

## 2022-03-23 NOTE — Clinical Note
pt called to reschedule appt he missed yesterday, pt was currently in lab and finished with his appt, spoke with patient at length by telephone for a smoking cessation follow up, rescheduled/checked in and charted after call was completed; 0 cig/day since 3/9/22; pt remains on prescribed tobacco cessation medication regimen of 2mg nicotine gum, along with remaining on 150mg bupropion BID recently prescribed by his PCP; discussed with pt benefits he may be noticing, maintaining quit status, and habit breaking behaviors; congratulated pt on progress this far and discussed next steps in quit plan

## 2022-03-23 NOTE — PROGRESS NOTES
Individual Follow-Up Form    3/23/2022    Quit Date: 3/9/22    Clinical Status of Patient: Outpatient    Length of Service: 30 minutes    Continuing Medication: yes  Wellbutrin or Nicotine gum    Other Medications:      Target Symptoms: Withdrawal and medication side effects. The following were  rated moderate (3) to severe (4) on TCRS:  · Moderate (3): none  · Severe (4): none    Comments: pt called to reschedule appt he missed yesterday, pt was currently in lab and finished with his appt, spoke with patient at length by telephone for a smoking cessation follow up, rescheduled/checked in and charted after call was completed;  0 cig/day since 3/9/22; pt remains on prescribed tobacco cessation medication regimen of 2mg nicotine gum, along with remaining on 150mg bupropion BID recently prescribed by his PCP; discussed with pt benefits he may be noticing, maintaining quit status, and habit breaking behaviors; congratulated pt on progress this far and discussed next steps in quit plan    Diagnosis: F17.200    Next Visit: 2 weeks

## 2022-04-13 ENCOUNTER — CLINICAL SUPPORT (OUTPATIENT)
Dept: SMOKING CESSATION | Facility: CLINIC | Age: 67
End: 2022-04-13
Payer: COMMERCIAL

## 2022-04-13 DIAGNOSIS — F17.200 NICOTINE DEPENDENCE: Primary | ICD-10-CM

## 2022-04-13 PROCEDURE — 99402 PR PREVENT COUNSEL,INDIV,30 MIN: ICD-10-PCS | Mod: S$GLB,,,

## 2022-04-13 PROCEDURE — 99402 PREV MED CNSL INDIV APPRX 30: CPT | Mod: S$GLB,,,

## 2022-04-13 NOTE — Clinical Note
0 cig/day since 3/9/22; pt remains on prescribed tobacco cessation medication regimen of 150mg bupropion BID recently prescribed by his PCP; discussed with pt benefits he may be noticing, maintaining quit status, and habit breaking behaviors; congratulated pt on progress this far and discussed next steps in quit plan

## 2022-04-13 NOTE — PROGRESS NOTES
Individual Follow-Up Form    4/13/2022    Quit Date: 3/9/22    Clinical Status of Patient: Outpatient    Length of Service: 30 minutes    Continuing Medication: yes  Wellbutrin    Other Medications:      Target Symptoms: Withdrawal and medication side effects. The following were  rated moderate (3) to severe (4) on TCRS:  · Moderate (3): none  · Severe (4): none    Comments: 0 cig/day since 3/9/22; pt remains on prescribed tobacco cessation medication regimen of 150mg bupropion BID recently prescribed by his PCP; discussed with pt benefits he may be noticing, maintaining quit status, and habit breaking behaviors; congratulated pt on progress this far and discussed next steps in quit plan        Diagnosis: F17.200    Next Visit: 1 month

## 2022-08-22 ENCOUNTER — HOSPITAL ENCOUNTER (EMERGENCY)
Facility: HOSPITAL | Age: 67
Discharge: HOME | End: 2022-08-22
Attending: STUDENT IN AN ORGANIZED HEALTH CARE EDUCATION/TRAINING PROGRAM
Payer: MEDICARE

## 2022-08-22 VITALS
RESPIRATION RATE: 18 BRPM | HEART RATE: 74 BPM | BODY MASS INDEX: 32.76 KG/M2 | TEMPERATURE: 97 F | WEIGHT: 234.88 LBS | OXYGEN SATURATION: 98 % | SYSTOLIC BLOOD PRESSURE: 173 MMHG | DIASTOLIC BLOOD PRESSURE: 83 MMHG

## 2022-08-22 DIAGNOSIS — M25.512 ACUTE PAIN OF LEFT SHOULDER: Primary | ICD-10-CM

## 2022-08-22 PROCEDURE — 99284 EMERGENCY DEPT VISIT MOD MDM: CPT | Mod: 25

## 2022-08-22 PROCEDURE — 63600175 PHARM REV CODE 636 W HCPCS: Performed by: STUDENT IN AN ORGANIZED HEALTH CARE EDUCATION/TRAINING PROGRAM

## 2022-08-22 PROCEDURE — 96372 THER/PROPH/DIAG INJ SC/IM: CPT | Performed by: STUDENT IN AN ORGANIZED HEALTH CARE EDUCATION/TRAINING PROGRAM

## 2022-08-22 RX ORDER — KETOROLAC TROMETHAMINE 30 MG/ML
15 INJECTION, SOLUTION INTRAMUSCULAR; INTRAVENOUS
Status: COMPLETED | OUTPATIENT
Start: 2022-08-22 | End: 2022-08-22

## 2022-08-22 RX ORDER — HYDROCODONE BITARTRATE AND ACETAMINOPHEN 5; 325 MG/1; MG/1
1 TABLET ORAL EVERY 4 HOURS PRN
Qty: 15 TABLET | Refills: 0 | Status: SHIPPED | OUTPATIENT
Start: 2022-08-22 | End: 2022-08-23

## 2022-08-22 RX ADMIN — KETOROLAC TROMETHAMINE 15 MG: 30 INJECTION, SOLUTION INTRAMUSCULAR at 10:08

## 2022-08-22 NOTE — ED TRIAGE NOTES
C/o left shoulder pain, left forearm pain, and intermittent left hand numbness since Saturday. Patient reports slung a trash bag with beer bottles Saturday and began having pain after incident.

## 2022-08-22 NOTE — ED PROVIDER NOTES
Encounter Date: 8/22/2022    This document was partially completed using speech recognition software and may contain misspellings, grammatical errors, and/or unexpected word substitutions.       History     Chief Complaint   Patient presents with    Shoulder Pain     67 year old male with a PMHx of HTN presents to the ED with left shoulder pain. Started after he was slinging trash bag with beer bottles on Saturday. No falls/traumas. Reports difficulty with range of motion and radiation down the arm to the left forearm and paresthesia of the left hand. No true numbness.        Review of patient's allergies indicates:   Allergen Reactions    Bee sting [allergen ext-venom-honey bee]      Past Medical History:   Diagnosis Date    Deep vein thrombosis     Fever blister     Hypertension     Ulcerative colitis      Past Surgical History:   Procedure Laterality Date    COLONOSCOPY      COLONOSCOPY N/A 05/09/2018    Procedure: COLONOSCOPY;  Surgeon: Elijah Horvath MD;  Location: [facility];  Service: Endoscopy;  Laterality: N/A;    COLONOSCOPY N/A 08/21/2019    Procedure: COLONOSCOPY;  Surgeon: Elijah Horvath MD;  Location: [facility];  Service: Endoscopy;  Laterality: N/A;    COLONOSCOPY N/A 08/18/2020    Procedure: COLONOSCOPY;  Surgeon: Elijah Horvath MD;  Location: [facility];  Service: Endoscopy;  Laterality: N/A;    COLONOSCOPY N/A 11/18/2021    Procedure: COLONOSCOPY;  Surgeon: Reynaldo Brown MD;  Location: [facility];  Service: Endoscopy;  Laterality: N/A;  Rapid Covid    COLONOSCOPY N/A 02/16/2022    Procedure: COLONOSCOPY;  Surgeon: Darnell Mayer MD;  Location: [facility];  Service: Endoscopy;  Laterality: N/A;    COLONOSCOPY N/A 7/20/2022    Procedure: COLONOSCOPY;  Surgeon: Dranell Mayer MD;  Location: [facility];  Service: Endoscopy;  Laterality: N/A;    TONSILLECTOMY       Family History   Problem Relation Age of Onset    Heart disease Mother     Cancer Father          colon    Colon cancer Father     Crohn's disease Daughter      Social History     Tobacco Use    Smoking status: Former Smoker     Packs/day: 1.00     Types: Cigarettes     Quit date: 3/9/2022     Years since quittin.4    Smokeless tobacco: Former User   Substance Use Topics    Alcohol use: Not Currently     Alcohol/week: 6.0 standard drinks     Types: 6 Cans of beer per week     Comment: states stopped about 2022    Drug use: Not Currently     Types: Marijuana     Comment: Hx ivda-quit 80's     Review of Systems   Constitutional: Negative for chills and fever.   HENT: Negative for congestion, rhinorrhea and sneezing.    Eyes: Negative for discharge and redness.   Respiratory: Negative for cough and shortness of breath.    Cardiovascular: Negative for chest pain and palpitations.   Gastrointestinal: Negative for abdominal pain, diarrhea and vomiting.   Genitourinary: Negative for difficulty urinating, flank pain and urgency.   Musculoskeletal: Positive for arthralgias. Negative for back pain and neck pain.   Skin: Negative for rash and wound.   Neurological: Negative for weakness, numbness and headaches.       Physical Exam     Initial Vitals [22 0955]   BP Pulse Resp Temp SpO2   (!) 191/93 90 20 97.1 °F (36.2 °C) 98 %      MAP       --         Physical Exam    Nursing note and vitals reviewed.  Constitutional: He appears well-developed. He is not diaphoretic. No distress.   HENT:   Head: Normocephalic and atraumatic.   Right Ear: External ear normal.   Left Ear: External ear normal.   Nose: Nose normal.   Eyes: Conjunctivae are normal. Right eye exhibits no discharge. Left eye exhibits no discharge. No scleral icterus.   Cardiovascular: Normal rate and regular rhythm.   Pulmonary/Chest: Breath sounds normal. No stridor. No respiratory distress. He has no wheezes. He has no rhonchi. He has no rales.   Abdominal: Abdomen is soft. He exhibits no distension. There is no abdominal tenderness. There  is no guarding.   Musculoskeletal:         General: No edema.      Left shoulder: Tenderness and bony tenderness (coracoid process) present. Decreased range of motion.     Neurological: He is alert and oriented to person, place, and time. GCS score is 15. GCS eye subscore is 4. GCS verbal subscore is 5. GCS motor subscore is 6.   Skin: Skin is warm and dry. Capillary refill takes less than 2 seconds.   Psychiatric: He has a normal mood and affect.         ED Course   Procedures  Labs Reviewed - No data to display       Imaging Results          X-Ray Shoulder 2 or More Views Left (Final result)  Result time 08/22/22 10:57:20    Final result by Angelita Swanson MD (08/22/22 10:57:20)                 Impression:      As above.      Electronically signed by: Angelita Swanson MD  Date:    08/22/2022  Time:    10:57             Narrative:    EXAMINATION:  XR SHOULDER COMPLETE 2 OR MORE VIEWS LEFT    CLINICAL HISTORY:  acuet left shoulder pain;    TECHNIQUE:  Three views of the left shoulder    COMPARISON:  None.    FINDINGS:  The humeral head is slightly high-riding which could suggest underlying rotator cuff pathology.  Subtle calcification adjacent to the greater tuberosity suggesting calcific tendinitis.  No displaced fractures identified.  No evidence of lytic or blastic lesions.Moderate degenerative changes of the acromioclavicular joint.Soft tissues are unremarkable.                                 Medications   ketorolac injection 15 mg (15 mg Intramuscular Given 8/22/22 1021)     Medical Decision Making:   Differential Diagnosis:   DDx: shoulder dislocation, shoulder fracture, MSK strain, rotator cuff, biceps tendon tear  ED Management:  Based on the patient's evaluation - patient appears well for discharge home. Negative x-rays for fracture or dislocation. Tendonitis vs rotator cuff injury considered. Sling placed in the ED. Will d/c home with PCP f/u and rx for pain control. Patient is in agreement with the  plan.                      Clinical Impression:   Final diagnoses:  [M25.512] Acute pain of left shoulder (Primary)          ED Disposition Condition    Discharge Stable        ED Prescriptions     Medication Sig Dispense Start Date End Date Auth. Provider    HYDROcodone-acetaminophen (NORCO) 5-325 mg per tablet Take 1 tablet by mouth every 4 (four) hours as needed for Pain. 15 tablet 8/22/2022  Isacc Caceres DO        Follow-up Information     Follow up With Specialties Details Why Contact Info    Maria C Galeas MD Family Medicine Schedule an appointment as soon as possible for a visit in 1 week As needed [address] [address]  [phone]             Isacc Caceres DO  08/22/22 1152

## 2022-11-02 ENCOUNTER — CLINICAL SUPPORT (OUTPATIENT)
Dept: SMOKING CESSATION | Facility: CLINIC | Age: 67
End: 2022-11-02
Payer: COMMERCIAL

## 2022-11-02 DIAGNOSIS — F17.200 NICOTINE DEPENDENCE: Primary | ICD-10-CM

## 2022-11-02 PROCEDURE — 99407 BEHAV CHNG SMOKING > 10 MIN: CPT | Mod: S$GLB,,, | Performed by: GENERAL PRACTICE

## 2022-11-02 PROCEDURE — 99407 PR TOBACCO USE CESSATION INTENSIVE >10 MINUTES: ICD-10-PCS | Mod: S$GLB,,, | Performed by: GENERAL PRACTICE

## 2022-11-02 NOTE — PROGRESS NOTES
Spoke with patient today in regard to smoking cessation progress 3 and 6 month telephone follow up, he states that he is not tobacco free.  Patient stated that he trimmed down from smoking 2 packs of cigarettes a day to 1 pack of cigarettes a month. Commended patient on the accomplishments thus far.  Informed patient of benefit period, future follow up, and contact information if any further help or support is needed.  Will complete smart form for 3 and 6 month follow up on Quit attempt #2

## 2023-01-02 ENCOUNTER — TELEPHONE (OUTPATIENT)
Dept: PHARMACY | Facility: CLINIC | Age: 68
End: 2023-01-02
Payer: MEDICARE

## 2023-01-05 ENCOUNTER — PATIENT OUTREACH (OUTPATIENT)
Dept: ADMINISTRATIVE | Facility: HOSPITAL | Age: 68
End: 2023-01-05
Payer: MEDICARE

## 2023-02-24 ENCOUNTER — LAB VISIT (OUTPATIENT)
Dept: LAB | Facility: HOSPITAL | Age: 68
End: 2023-02-24
Attending: FAMILY MEDICINE
Payer: MEDICARE

## 2023-02-24 DIAGNOSIS — I10 PRIMARY HYPERTENSION: ICD-10-CM

## 2023-02-24 LAB
ANION GAP SERPL CALC-SCNC: 9 MMOL/L (ref 8–16)
BUN SERPL-MCNC: 14 MG/DL (ref 8–23)
CALCIUM SERPL-MCNC: 9.5 MG/DL (ref 8.7–10.5)
CHLORIDE SERPL-SCNC: 103 MMOL/L (ref 95–110)
CO2 SERPL-SCNC: 27 MMOL/L (ref 23–29)
CREAT SERPL-MCNC: 1.1 MG/DL (ref 0.5–1.4)
EST. GFR  (NO RACE VARIABLE): >60 ML/MIN/1.73 M^2
GLUCOSE SERPL-MCNC: 92 MG/DL (ref 70–110)
POTASSIUM SERPL-SCNC: 4.2 MMOL/L (ref 3.5–5.1)
SODIUM SERPL-SCNC: 139 MMOL/L (ref 136–145)

## 2023-02-24 PROCEDURE — 36415 COLL VENOUS BLD VENIPUNCTURE: CPT | Performed by: FAMILY MEDICINE

## 2023-02-24 PROCEDURE — 80048 BASIC METABOLIC PNL TOTAL CA: CPT | Performed by: FAMILY MEDICINE

## 2023-03-23 ENCOUNTER — CLINICAL SUPPORT (OUTPATIENT)
Dept: SMOKING CESSATION | Facility: CLINIC | Age: 68
End: 2023-03-23
Payer: COMMERCIAL

## 2023-03-23 DIAGNOSIS — F17.200 NICOTINE DEPENDENCE: Primary | ICD-10-CM

## 2023-03-23 PROCEDURE — 99407 PR TOBACCO USE CESSATION INTENSIVE >10 MINUTES: ICD-10-PCS | Mod: S$GLB,,, | Performed by: FAMILY MEDICINE

## 2023-03-23 PROCEDURE — 99999 PR PBB SHADOW E&M-EST. PATIENT-LVL I: CPT | Mod: PBBFAC,,,

## 2023-03-23 PROCEDURE — 99407 BEHAV CHNG SMOKING > 10 MIN: CPT | Mod: S$GLB,,, | Performed by: FAMILY MEDICINE

## 2023-03-23 PROCEDURE — 99999 PR PBB SHADOW E&M-EST. PATIENT-LVL I: ICD-10-PCS | Mod: PBBFAC,,,

## 2023-03-23 NOTE — PROGRESS NOTES
Spoke with patient today in regard to smoking cessation progress for 12 mo telephone f/u, he states tobacco free. Commended patient on the accomplishment. Informed patient of benefit period and contact information if any further help or support is needed. Will resolve episode and complete smart form for Quit attempt #2.

## 2023-06-26 ENCOUNTER — PATIENT OUTREACH (OUTPATIENT)
Dept: ADMINISTRATIVE | Facility: HOSPITAL | Age: 68
End: 2023-06-26
Payer: MEDICARE

## 2023-08-29 ENCOUNTER — PATIENT OUTREACH (OUTPATIENT)
Dept: ADMINISTRATIVE | Facility: HOSPITAL | Age: 68
End: 2023-08-29
Payer: MEDICARE

## 2023-09-28 ENCOUNTER — PATIENT OUTREACH (OUTPATIENT)
Dept: ADMINISTRATIVE | Facility: HOSPITAL | Age: 68
End: 2023-09-28
Payer: MEDICARE

## 2023-10-02 ENCOUNTER — PATIENT MESSAGE (OUTPATIENT)
Dept: ADMINISTRATIVE | Facility: OTHER | Age: 68
End: 2023-10-02
Payer: MEDICARE

## 2023-10-27 ENCOUNTER — PATIENT OUTREACH (OUTPATIENT)
Dept: ADMINISTRATIVE | Facility: HOSPITAL | Age: 68
End: 2023-10-27
Payer: MEDICARE

## 2023-11-09 ENCOUNTER — PATIENT OUTREACH (OUTPATIENT)
Dept: ADMINISTRATIVE | Facility: HOSPITAL | Age: 68
End: 2023-11-09
Payer: MEDICARE

## 2023-12-07 ENCOUNTER — PATIENT OUTREACH (OUTPATIENT)
Dept: ADMINISTRATIVE | Facility: HOSPITAL | Age: 68
End: 2023-12-07
Payer: MEDICARE

## 2024-12-23 ENCOUNTER — PATIENT MESSAGE (OUTPATIENT)
Dept: ADMINISTRATIVE | Facility: HOSPITAL | Age: 69
End: 2024-12-23
Payer: MEDICARE